# Patient Record
Sex: FEMALE | Race: WHITE | Employment: FULL TIME | URBAN - METROPOLITAN AREA
[De-identification: names, ages, dates, MRNs, and addresses within clinical notes are randomized per-mention and may not be internally consistent; named-entity substitution may affect disease eponyms.]

---

## 2021-02-06 ENCOUNTER — HOSPITAL ENCOUNTER (EMERGENCY)
Age: 46
Discharge: HOME | End: 2021-02-06
Attending: EMERGENCY MEDICINE
Payer: COMMERCIAL

## 2021-02-06 VITALS
DIASTOLIC BLOOD PRESSURE: 82 MMHG | SYSTOLIC BLOOD PRESSURE: 120 MMHG | WEIGHT: 135 LBS | HEART RATE: 114 BPM | TEMPERATURE: 98 F | HEIGHT: 62 IN | OXYGEN SATURATION: 99 % | BODY MASS INDEX: 24.84 KG/M2 | RESPIRATION RATE: 16 BRPM

## 2021-02-06 DIAGNOSIS — K92.1 BLOOD IN STOOL: Primary | ICD-10-CM

## 2021-02-06 LAB
ALBUMIN SERPL-MCNC: 4 G/DL (ref 3.5–5)
ALBUMIN/GLOB SERPL: 1.1 {RATIO} (ref 1.1–2.2)
ALP SERPL-CCNC: 71 U/L (ref 45–117)
ALT SERPL-CCNC: 45 U/L (ref 12–78)
ANION GAP SERPL CALC-SCNC: 8 MMOL/L (ref 5–15)
APPEARANCE UR: CLEAR
AST SERPL-CCNC: 20 U/L (ref 15–37)
BACTERIA URNS QL MICRO: NEGATIVE /HPF
BASOPHILS # BLD: 0.1 K/UL (ref 0–0.1)
BASOPHILS NFR BLD: 1 % (ref 0–1)
BILIRUB SERPL-MCNC: 0.4 MG/DL (ref 0.2–1)
BILIRUB UR QL: NEGATIVE
BUN SERPL-MCNC: 14 MG/DL (ref 6–20)
BUN/CREAT SERPL: 15 (ref 12–20)
CALCIUM SERPL-MCNC: 9 MG/DL (ref 8.5–10.1)
CHLORIDE SERPL-SCNC: 106 MMOL/L (ref 97–108)
CO2 SERPL-SCNC: 25 MMOL/L (ref 21–32)
COLOR UR: NORMAL
COMMENT, HOLDF: NORMAL
CREAT SERPL-MCNC: 0.91 MG/DL (ref 0.55–1.02)
DIFFERENTIAL METHOD BLD: NORMAL
EOSINOPHIL # BLD: 0.2 K/UL (ref 0–0.4)
EOSINOPHIL NFR BLD: 2 % (ref 0–7)
EPITH CASTS URNS QL MICRO: NORMAL /LPF
ERYTHROCYTE [DISTWIDTH] IN BLOOD BY AUTOMATED COUNT: 12.3 % (ref 11.5–14.5)
GLOBULIN SER CALC-MCNC: 3.7 G/DL (ref 2–4)
GLUCOSE SERPL-MCNC: 97 MG/DL (ref 65–100)
GLUCOSE UR STRIP.AUTO-MCNC: NEGATIVE MG/DL
HCT VFR BLD AUTO: 41.9 % (ref 35–47)
HEMOCCULT STL QL: NEGATIVE
HGB BLD-MCNC: 14 G/DL (ref 11.5–16)
HGB UR QL STRIP: NEGATIVE
HYALINE CASTS URNS QL MICRO: NORMAL /LPF (ref 0–5)
IMM GRANULOCYTES # BLD AUTO: 0 K/UL (ref 0–0.04)
IMM GRANULOCYTES NFR BLD AUTO: 0 % (ref 0–0.5)
KETONES UR QL STRIP.AUTO: NEGATIVE MG/DL
LEUKOCYTE ESTERASE UR QL STRIP.AUTO: NEGATIVE
LYMPHOCYTES # BLD: 1.7 K/UL (ref 0.8–3.5)
LYMPHOCYTES NFR BLD: 20 % (ref 12–49)
MCH RBC QN AUTO: 28.5 PG (ref 26–34)
MCHC RBC AUTO-ENTMCNC: 33.4 G/DL (ref 30–36.5)
MCV RBC AUTO: 85.2 FL (ref 80–99)
MONOCYTES # BLD: 0.5 K/UL (ref 0–1)
MONOCYTES NFR BLD: 6 % (ref 5–13)
NEUTS SEG # BLD: 6.1 K/UL (ref 1.8–8)
NEUTS SEG NFR BLD: 71 % (ref 32–75)
NITRITE UR QL STRIP.AUTO: NEGATIVE
NRBC # BLD: 0 K/UL (ref 0–0.01)
NRBC BLD-RTO: 0 PER 100 WBC
PH UR STRIP: 6 [PH] (ref 5–8)
PLATELET # BLD AUTO: 265 K/UL (ref 150–400)
PMV BLD AUTO: 10.9 FL (ref 8.9–12.9)
POTASSIUM SERPL-SCNC: 3.8 MMOL/L (ref 3.5–5.1)
PROT SERPL-MCNC: 7.7 G/DL (ref 6.4–8.2)
PROT UR STRIP-MCNC: NEGATIVE MG/DL
RBC # BLD AUTO: 4.92 M/UL (ref 3.8–5.2)
RBC #/AREA URNS HPF: NORMAL /HPF (ref 0–5)
SAMPLES BEING HELD,HOLD: NORMAL
SODIUM SERPL-SCNC: 139 MMOL/L (ref 136–145)
SP GR UR REFRACTOMETRY: 1 (ref 1–1.03)
UR CULT HOLD, URHOLD: NORMAL
UROBILINOGEN UR QL STRIP.AUTO: 0.2 EU/DL (ref 0.2–1)
WBC # BLD AUTO: 8.5 K/UL (ref 3.6–11)
WBC URNS QL MICRO: NORMAL /HPF (ref 0–4)

## 2021-02-06 PROCEDURE — 80053 COMPREHEN METABOLIC PANEL: CPT

## 2021-02-06 PROCEDURE — 74011250637 HC RX REV CODE- 250/637: Performed by: PHYSICIAN ASSISTANT

## 2021-02-06 PROCEDURE — 36415 COLL VENOUS BLD VENIPUNCTURE: CPT

## 2021-02-06 PROCEDURE — 99283 EMERGENCY DEPT VISIT LOW MDM: CPT

## 2021-02-06 PROCEDURE — 82272 OCCULT BLD FECES 1-3 TESTS: CPT

## 2021-02-06 PROCEDURE — 85025 COMPLETE CBC W/AUTO DIFF WBC: CPT

## 2021-02-06 PROCEDURE — 81001 URINALYSIS AUTO W/SCOPE: CPT

## 2021-02-06 RX ORDER — DICYCLOMINE HYDROCHLORIDE 10 MG/1
10 CAPSULE ORAL
Status: COMPLETED | OUTPATIENT
Start: 2021-02-06 | End: 2021-02-06

## 2021-02-06 RX ADMIN — DICYCLOMINE HYDROCHLORIDE 10 MG: 10 CAPSULE ORAL at 11:20

## 2021-02-06 NOTE — ED TRIAGE NOTES
Sent here by [facility] for further evaluation: pt has had 4 episodes of BRB with BM since Thursday, along with bloating and abdominal pain. [facility]'s exam was neg for on fecal occult and there were no hemorrhoids noted.

## 2021-02-06 NOTE — ED PROVIDER NOTES
Date of Service:  2/6/2021    Patient:  Paris Miranda    Chief Complaint:  Rectal Bleeding       HPI:  Paris Miranda is a 42-year-old female with no significant past medical history presenting today for multiple episodes of bright red blood with bowel movements. 3 days ago had 2 episodes of bowel movements, with bright red blood that filled up the toilet, does not notice bright red blood upon wiping. Head one episode Friday, 1 episode this morning. Went to [facility] urgent care this morning who did a fecal occult blood test, heme-negative. Patient endorses generalized fatigue, abdominal bloating, intermittent abdominal cramping. Denies fever, chills, chest pain, shortness of breath, nausea, vomiting, diarrhea, dysuria, numbness, tingling, weakness. Denies any known sick contacts. Denies any recent travel, abnormal food intake. No contacts with similar symptoms. Past medical history: None  Surgeries: None  NKDA  No smoking/occasional alcohol/no drugs             No past medical history on file. No past surgical history on file. No family history on file.     Social History     Socioeconomic History    Marital status: Not on file     Spouse name: Not on file    Number of children: Not on file    Years of education: Not on file    Highest education level: Not on file   Occupational History    Not on file   Social Needs    Financial resource strain: Not on file    Food insecurity     Worry: Not on file     Inability: Not on file    Transportation needs     Medical: Not on file     Non-medical: Not on file   Tobacco Use    Smoking status: Not on file   Substance and Sexual Activity    Alcohol use: Not on file    Drug use: Not on file    Sexual activity: Not on file   Lifestyle    Physical activity     Days per week: Not on file     Minutes per session: Not on file    Stress: Not on file   Relationships    Social connections     Talks on phone: Not on file     Gets together: Not on file Attends Buddhist service: Not on file     Active member of club or organization: Not on file     Attends meetings of clubs or organizations: Not on file     Relationship status: Not on file    Intimate partner violence     Fear of current or ex partner: Not on file     Emotionally abused: Not on file     Physically abused: Not on file     Forced sexual activity: Not on file   Other Topics Concern    Not on file   Social History Narrative    Not on file         ALLERGIES: Patient has no known allergies. Review of Systems   Constitutional: Negative for chills and fever. HENT: Negative for trouble swallowing. Eyes: Negative for redness. Respiratory: Negative for shortness of breath. Cardiovascular: Negative for chest pain. Gastrointestinal: Negative for abdominal pain, diarrhea, nausea and vomiting. Genitourinary: Negative for dysuria. Musculoskeletal: Negative for gait problem. Skin: Negative for rash. Neurological: Negative for syncope. Vitals:    02/06/21 0859   BP: 120/82   Pulse: (!) 114   Resp: 16   Temp: 98 °F (36.7 °C)   SpO2: 99%   Weight: 61.2 kg (135 lb)   Height: 5' 2\" (1.575 m)            Physical Exam  Vitals signs and nursing note reviewed. Constitutional:       Appearance: Normal appearance. HENT:      Head: Normocephalic and atraumatic. Nose: Nose normal.   Eyes:      Extraocular Movements: Extraocular movements intact. Conjunctiva/sclera: Conjunctivae normal.      Pupils: Pupils are equal, round, and reactive to light. Neck:      Musculoskeletal: Normal range of motion and neck supple. Cardiovascular:      Rate and Rhythm: Normal rate and regular rhythm. Pulses: Normal pulses. Radial pulses are 2+ on the right side and 2+ on the left side. Posterior tibial pulses are 2+ on the right side and 2+ on the left side. Heart sounds: Normal heart sounds.    Pulmonary:      Effort: Pulmonary effort is normal.      Breath sounds: Normal breath sounds. Abdominal:      General: Abdomen is flat. Bowel sounds are normal.      Palpations: Abdomen is soft. Tenderness: There is no abdominal tenderness. There is no right CVA tenderness, left CVA tenderness, guarding or rebound. Genitourinary:     Rectum: Normal. Guaiac result negative. No mass, tenderness, anal fissure or external hemorrhoid. Normal anal tone. Musculoskeletal: Normal range of motion. Skin:     General: Skin is warm and dry. Neurological:      General: No focal deficit present. Mental Status: She is alert and oriented to person, place, and time. Mental status is at baseline. Psychiatric:         Mood and Affect: Mood normal.         Behavior: Behavior normal.         Thought Content:  Thought content normal.          MDM  Number of Diagnoses or Management Options  Blood in stool  Diagnosis management comments: Plan: CBC, CMP, POC fecal occult, reassess    LABS:  Recent Results (from the past 6 hour(s))  -CBC WITH AUTOMATED DIFF  Collection Time: 02/06/21  9:37 AM       Result                      Value             Ref Range           WBC                         8.5               3.6 - 11.0 K*       RBC                         4.92              3.80 - 5.20 *       HGB                         14.0              11.5 - 16.0 *       HCT                         41.9              35.0 - 47.0 %       MCV                         85.2              80.0 - 99.0 *       MCH                         28.5              26.0 - 34.0 *       MCHC                        33.4              30.0 - 36.5 *       RDW                         12.3              11.5 - 14.5 %       PLATELET                    265               150 - 400 K/*       MPV                         10.9              8.9 - 12.9 FL       NRBC                        0.0               0  WBC       ABSOLUTE NRBC               0.00              0.00 - 0.01 *       NEUTROPHILS                 71                32 - 75 %           LYMPHOCYTES                 20                12 - 49 %           MONOCYTES                   6                 5 - 13 %            EOSINOPHILS                 2                 0 - 7 %             BASOPHILS                   1                 0 - 1 %             IMMATURE GRANULOCYTES       0                 0.0 - 0.5 %         ABS. NEUTROPHILS            6.1               1.8 - 8.0 K/*       ABS. LYMPHOCYTES            1.7               0.8 - 3.5 K/*       ABS. MONOCYTES              0.5               0.0 - 1.0 K/*       ABS. EOSINOPHILS            0.2               0.0 - 0.4 K/*       ABS. BASOPHILS              0.1               0.0 - 0.1 K/*       ABS. IMM. GRANS.            0.0               0.00 - 0.04 *       DF                          AUTOMATED                        -METABOLIC PANEL, COMPREHENSIVE  Collection Time: 02/06/21  9:37 AM       Result                      Value             Ref Range           Sodium                      139               136 - 145 mm*       Potassium                   3.8               3.5 - 5.1 mm*       Chloride                    106               97 - 108 mmo*       CO2                         25                21 - 32 mmol*       Anion gap                   8                 5 - 15 mmol/L       Glucose                     97                65 - 100 mg/*       BUN                         14                6 - 20 MG/DL        Creatinine                  0.91              0.55 - 1.02 *       BUN/Creatinine ratio        15                12 - 20             GFR est AA                  >60               >60 ml/min/1*       GFR est non-AA              >60               >60 ml/min/1*       Calcium                     9.0               8.5 - 10.1 M*       Bilirubin, total            0.4               0.2 - 1.0 MG*       ALT (SGPT)                  45                12 - 78 U/L         AST (SGOT)                  20                15 - 37 U/L         Alk.  phosphatase 71                45 - 117 U/L        Protein, total              7.7               6.4 - 8.2 g/*       Albumin                     4.0               3.5 - 5.0 g/*       Globulin                    3.7               2.0 - 4.0 g/*       A-G Ratio                   1.1               1.1 - 2.2      -URINALYSIS W/MICROSCOPIC  Collection Time: 02/06/21  9:37 AM       Result                      Value             Ref Range           Color                       YELLOW/STRAW                          Appearance                  CLEAR             CLEAR               Specific gravity            1.005             1.003 - 1.03*       pH (UA)                     6.0               5.0 - 8.0           Protein                     Negative          NEG mg/dL           Glucose                     Negative          NEG mg/dL           Ketone                      Negative          NEG mg/dL           Bilirubin                   Negative          NEG                 Blood                       Negative          NEG                 Urobilinogen                0.2               0.2 - 1.0 EU*       Nitrites                    Negative          NEG                 Leukocyte Esterase          Negative          NEG                 WBC                         0-4               0 - 4 /hpf          RBC                         0-5               0 - 5 /hpf          Epithelial cells            FEW               FEW /lpf            Bacteria                    Negative          NEG /hpf            Hyaline cast                0-2               0 - 5 /lpf     -URINE CULTURE HOLD SAMPLE  Collection Time: 02/06/21  9:37 AM  Specimen: Serum; Urine       Result                      Value             Ref Range           Urine culture hold                                            Urine on hold in Microbiology dept for 2 days.   If unpreserved urine is submitted, it cannot be used for addtional testing after 24 hours, recollection will be required. -SAMPLES BEING HELD  Collection Time: 02/06/21  9:37 AM       Result                      Value             Ref Range           SAMPLES BEING HELD          1RED,1BL,1SST                         COMMENT                                                       Add-on orders for these samples will be processed based on acceptable specimen integrity and analyte stability, which may vary by analyte. -OCCULT BLOOD, STOOL  Collection Time: 02/06/21  9:46 AM       Result                      Value             Ref Range           Occult blood, stool         Negative          NEG               IMAGING:  No orders to display      Medications During Visit:  Medications  dicyclomine (BENTYL) capsule 10 mg (10 mg Oral Given 2/6/21 1120)      DECISION MAKING:  Jumana Copeland is a 39 y.o. female who comes in as above. Endorses blood in the stool for the past 3 days, with normal bowel movements. Blood is bright red blood. Unable to quantify amount. Endorses intermittent abdominal bloating, cramping, no abdominal tenderness palpation. No additional review of systems. No sick contacts, no recent travel, no abnormal food and take. Vitals stable, patient resting comfortably no acute distress, abdomen soft, nontender. Fecal occult blood test negative, CBC, CMP, urinalysis are reassuring and showed no clear etiology. Discussed all results with patient, patient has no additional questions at this time. Would recommend close follow-up with PCP and GI, patient. Patient management discussed with attending physician. Strict ED return precautions discussed. IMPRESSION:  Blood in stool  (primary encounter diagnosis)    DISPOSITION:  Discharged      There are no discharge medications for this patient.        Follow-up Information     Follow up With Specialties Details Why Contact Светлана Shin [address] [facility],  Family Medicine Schedule an appointment as soon   as possible for a visit in 1 week  [id number]  [address]      [facility] EMERGENCY DEPT Emergency Medicine Go to  If symptoms worsen [address] [facility] [place]  [phone]    [facility]  Schedule an appointment as soon as   possible for a visit in 1 week  [address]  [address]  [phone]          The patient is asked to follow-up with their primary care provider in the next several days. They are to call tomorrow for an appointment. The patient is asked to return promptly for any increased concerns or worsening of symptoms. They can return to this emergency department or any other emergency department. ED Course as of Feb 06 1135   Sat Feb 06, 2021   1046 HR: 91    [EK]   1128 Patient resting comfortably, no TTP on abdominal exam. Discussed all results with patient, patient has no additional questions at this time.      [EK]      ED Course User Index  [EK] Roxanne Pedersen PA-C       Procedures

## 2021-03-08 ENCOUNTER — TRANSCRIBE ORDER (OUTPATIENT)
Dept: REGISTRATION | Age: 46
End: 2021-03-08

## 2021-03-08 DIAGNOSIS — Z20.822 ENCOUNTER FOR PREOPERATIVE SCREENING LABORATORY TESTING FOR COVID-19 VIRUS: Primary | ICD-10-CM

## 2021-03-08 DIAGNOSIS — Z01.812 ENCOUNTER FOR PREOPERATIVE SCREENING LABORATORY TESTING FOR COVID-19 VIRUS: Primary | ICD-10-CM

## 2021-03-19 ENCOUNTER — TRANSCRIBE ORDER (OUTPATIENT)
Dept: SCHEDULING | Age: 46
End: 2021-03-19

## 2021-03-19 DIAGNOSIS — R10.30 LOWER ABDOMINAL PAIN: ICD-10-CM

## 2021-03-19 DIAGNOSIS — K64.8 INTERNAL HEMORRHOIDS: Primary | ICD-10-CM

## 2021-03-19 DIAGNOSIS — K57.90 DIVERTICULOSIS: ICD-10-CM

## 2021-03-25 ENCOUNTER — TRANSCRIBE ORDER (OUTPATIENT)
Dept: SCHEDULING | Age: 46
End: 2021-03-25

## 2021-03-25 DIAGNOSIS — K57.93: Primary | ICD-10-CM

## 2021-03-26 ENCOUNTER — HOSPITAL ENCOUNTER (OUTPATIENT)
Dept: CT IMAGING | Age: 46
Discharge: HOME | End: 2021-03-26
Attending: INTERNAL MEDICINE
Payer: COMMERCIAL

## 2021-03-26 DIAGNOSIS — R10.30 LOWER ABDOMINAL PAIN: ICD-10-CM

## 2021-03-26 DIAGNOSIS — K64.8 INTERNAL HEMORRHOIDS: ICD-10-CM

## 2021-03-26 DIAGNOSIS — K57.90 DIVERTICULOSIS: ICD-10-CM

## 2021-03-26 PROCEDURE — 74177 CT ABD & PELVIS W/CONTRAST: CPT

## 2021-03-26 PROCEDURE — 74011000636 HC RX REV CODE- 636: Performed by: RADIOLOGY

## 2021-03-26 RX ADMIN — IOPAMIDOL 100 ML: 755 INJECTION, SOLUTION INTRAVENOUS at 08:15
